# Patient Record
Sex: MALE | NOT HISPANIC OR LATINO | Employment: OTHER | URBAN - METROPOLITAN AREA
[De-identification: names, ages, dates, MRNs, and addresses within clinical notes are randomized per-mention and may not be internally consistent; named-entity substitution may affect disease eponyms.]

---

## 2024-02-05 RX ORDER — PREDNISONE 10 MG/1
10 TABLET ORAL DAILY
COMMUNITY
Start: 2024-01-02

## 2024-02-05 RX ORDER — DEXTROMETHORPHAN HYDROBROMIDE, GUAIFENESIN 5; 100 MG/5ML; MG/5ML
650 LIQUID ORAL EVERY 8 HOURS PRN
COMMUNITY

## 2024-02-05 RX ORDER — AMITRIPTYLINE HYDROCHLORIDE 10 MG/1
10 TABLET, FILM COATED ORAL NIGHTLY
COMMUNITY
Start: 2023-11-17

## 2024-02-05 RX ORDER — CETIRIZINE HYDROCHLORIDE 5 MG/1
5 TABLET ORAL DAILY
COMMUNITY

## 2024-03-18 ENCOUNTER — APPOINTMENT (OUTPATIENT)
Dept: CARDIOLOGY | Facility: CLINIC | Age: 59
End: 2024-03-18
Payer: COMMERCIAL

## 2024-04-22 ENCOUNTER — APPOINTMENT (OUTPATIENT)
Dept: CARDIOLOGY | Facility: CLINIC | Age: 59
End: 2024-04-22
Payer: COMMERCIAL

## 2024-06-03 ENCOUNTER — OFFICE VISIT (OUTPATIENT)
Dept: CARDIOLOGY | Facility: CLINIC | Age: 59
End: 2024-06-03
Payer: COMMERCIAL

## 2024-06-03 VITALS
OXYGEN SATURATION: 98 % | SYSTOLIC BLOOD PRESSURE: 149 MMHG | TEMPERATURE: 97.7 F | HEIGHT: 72 IN | RESPIRATION RATE: 16 BRPM | WEIGHT: 210 LBS | DIASTOLIC BLOOD PRESSURE: 84 MMHG | BODY MASS INDEX: 28.44 KG/M2 | HEART RATE: 73 BPM

## 2024-06-03 DIAGNOSIS — L97.919 VARICOSE VEINS OF LOWER EXTREMITIES WITH ULCER AND INFLAMMATION (MULTI): ICD-10-CM

## 2024-06-03 DIAGNOSIS — I83.229 VARICOSE VEINS OF LOWER EXTREMITIES WITH ULCER AND INFLAMMATION (MULTI): ICD-10-CM

## 2024-06-03 DIAGNOSIS — I73.9 PAD (PERIPHERAL ARTERY DISEASE) (CMS-HCC): ICD-10-CM

## 2024-06-03 DIAGNOSIS — I83.219 VARICOSE VEINS OF LOWER EXTREMITIES WITH ULCER AND INFLAMMATION (MULTI): ICD-10-CM

## 2024-06-03 DIAGNOSIS — L97.929 VARICOSE VEINS OF LOWER EXTREMITIES WITH ULCER AND INFLAMMATION (MULTI): ICD-10-CM

## 2024-06-03 DIAGNOSIS — R60.0 BILATERAL LEG EDEMA: Primary | ICD-10-CM

## 2024-06-03 PROCEDURE — 99213 OFFICE O/P EST LOW 20 MIN: CPT | Performed by: INTERNAL MEDICINE

## 2024-06-03 PROCEDURE — 99203 OFFICE O/P NEW LOW 30 MIN: CPT | Performed by: INTERNAL MEDICINE

## 2024-06-03 PROCEDURE — 1036F TOBACCO NON-USER: CPT | Performed by: INTERNAL MEDICINE

## 2024-06-03 RX ORDER — IMIPRAMINE HYDROCHLORIDE 10 MG/1
10 TABLET, FILM COATED ORAL NIGHTLY
COMMUNITY

## 2024-06-03 RX ORDER — ALENDRONATE SODIUM 35 MG/1
35 TABLET ORAL
COMMUNITY

## 2024-06-03 ASSESSMENT — ENCOUNTER SYMPTOMS
MYALGIAS: 1
GASTROINTESTINAL NEGATIVE: 1
EYES NEGATIVE: 1
PSYCHIATRIC NEGATIVE: 1
CONSTITUTIONAL NEGATIVE: 1
RESPIRATORY NEGATIVE: 1
HEMATOLOGIC/LYMPHATIC NEGATIVE: 1
NEUROLOGICAL NEGATIVE: 1

## 2024-06-03 NOTE — PROGRESS NOTES
Subjective   Patient ID: Kwesi Billingsley is a 58 y.o. male who presents for Varicose Veins.    HPI   Very pleasant 58 year old male presents here today for varicose veins of bilateral lower extremities. Patient complains of leg swelling worse at the end of the day along with leg cramps. Denies any bleeding or bruising. Denies any spontaneous ulceration or bleeding.   Patient has never worn compression stockings. Patient is currently on prednisone. He notes that he has had migraines his whole life.Patient also has macular degeneration so has vision and hearing problems.   Patient is a non-smoker.     Review of Systems   Constitutional: Negative.    HENT: Negative.     Eyes: Negative.    Respiratory: Negative.     Cardiovascular:  Positive for leg swelling.   Gastrointestinal: Negative.    Musculoskeletal:  Positive for myalgias.   Skin: Negative.    Neurological: Negative.    Hematological: Negative.    Psychiatric/Behavioral: Negative.     All other systems reviewed and are negative.      Objective   /84   Pulse 73   Temp 36.5 °C (97.7 °F)   Resp 16   Ht 1.829 m (6')   Wt 95.3 kg (210 lb)   SpO2 98%   BMI 28.48 kg/m²     Physical Exam  Vitals reviewed.   Constitutional:       Appearance: Normal appearance.   HENT:      Head: Normocephalic and atraumatic.      Nose: Nose normal.   Cardiovascular:      Rate and Rhythm: Normal rate and regular rhythm.   Pulmonary:      Effort: Pulmonary effort is normal.      Breath sounds: Normal breath sounds.   Neurological:      General: No focal deficit present.      Mental Status: He is alert and oriented to person, place, and time.   Psychiatric:         Mood and Affect: Mood normal.         Behavior: Behavior normal.         Assessment/Plan   Diagnoses and all orders for this visit:  Bilateral leg edema  -     Vascular US Lower Extremity Venous Insufficiency Bilateral; Future  Varicose veins of lower extremities with ulcer and inflammation (Multi)  -     Vascular US  Lower Extremity Venous Insufficiency Bilateral; Future  PAD (peripheral artery disease) ([facility])  -     Vascular US ankle brachial index (BIPIN) with exercise; Future    58 year old male here with symptomatic varicose veins of bilateral lower extremities.Patient endorse leg swelling worse at the end of the day along with muscle cramps with ambulation. Denies any spontaneous ulceration or bleeding.      1- Will get a venous insufficiency study and BIPIN with toe pressures of bilateral lower extremities.  2- Patient will benefit from compression stockings. Will wait for imaging to determine size and pressure.  3- follow up with imaging results when available    Scribe Attestation  By signing my name below, IDulce , Scribe   attest that this documentation has been prepared under the direction and in the presence of Robbi Morales MD.

## 2024-06-27 ASSESSMENT — ENCOUNTER SYMPTOMS
CONSTITUTIONAL NEGATIVE: 1
HEMATOLOGIC/LYMPHATIC NEGATIVE: 1
NEUROLOGICAL NEGATIVE: 1
PSYCHIATRIC NEGATIVE: 1
RESPIRATORY NEGATIVE: 1
MYALGIAS: 1
GASTROINTESTINAL NEGATIVE: 1
EYES NEGATIVE: 1

## 2024-06-27 NOTE — PROGRESS NOTES
Subjective   Patient ID: Kwesi Billingsley is a 58 y.o. male who presents for No chief complaint on file..    HPI   Very pleasant 58 year old male presents here today for varicose veins of bilateral lower extremities. Patient complains of leg swelling worse at the end of the day along with leg cramps. Denies any bleeding or bruising. Denies any spontaneous ulceration or bleeding.   Patient has never worn compression stockings. Patient is currently on prednisone. He notes that he has had migraines his whole life.Patient also has macular degeneration so has vision and hearing problems.   Patient is a non-smoker.     Review of Systems   Constitutional: Negative.    HENT: Negative.     Eyes: Negative.    Respiratory: Negative.     Cardiovascular:  Positive for leg swelling.   Gastrointestinal: Negative.    Musculoskeletal:  Positive for myalgias.   Skin: Negative.    Neurological: Negative.    Hematological: Negative.    Psychiatric/Behavioral: Negative.     All other systems reviewed and are negative.      Objective   There were no vitals taken for this visit.    Physical Exam  Vitals reviewed.   Constitutional:       Appearance: Normal appearance.   HENT:      Head: Normocephalic and atraumatic.      Nose: Nose normal.   Cardiovascular:      Rate and Rhythm: Normal rate and regular rhythm.   Pulmonary:      Effort: Pulmonary effort is normal.      Breath sounds: Normal breath sounds.   Neurological:      General: No focal deficit present.      Mental Status: He is alert and oriented to person, place, and time.   Psychiatric:         Mood and Affect: Mood normal.         Behavior: Behavior normal.         Assessment/Plan   There are no diagnoses linked to this encounter.    58 year old male here with symptomatic varicose veins of bilateral lower extremities.Patient endorse leg swelling worse at the end of the day along with muscle cramps with ambulation. Denies any spontaneous ulceration or bleeding.      1- Will get a  venous insufficiency study and BIPIN with toe pressures of bilateral lower extremities.  2- Patient will benefit from compression stockings. Will wait for imaging to determine size and pressure.  3- follow up with imaging results when available    Scribe Attestation  By signing my name below, IDulce , Scribe   attest that this documentation has been prepared under the direction and in the presence of Robbi Moarles MD.

## 2024-07-01 ENCOUNTER — APPOINTMENT (OUTPATIENT)
Dept: CARDIOLOGY | Facility: CLINIC | Age: 59
End: 2024-07-01
Payer: COMMERCIAL